# Patient Record
Sex: MALE | Race: BLACK OR AFRICAN AMERICAN | Employment: FULL TIME | URBAN - METROPOLITAN AREA
[De-identification: names, ages, dates, MRNs, and addresses within clinical notes are randomized per-mention and may not be internally consistent; named-entity substitution may affect disease eponyms.]

---

## 2020-12-25 ENCOUNTER — APPOINTMENT (OUTPATIENT)
Dept: CT IMAGING | Age: 30
DRG: 579 | End: 2020-12-25

## 2020-12-25 ENCOUNTER — HOSPITAL ENCOUNTER (INPATIENT)
Age: 30
LOS: 1 days | Discharge: HOME | DRG: 579 | End: 2020-12-25
Attending: EMERGENCY MEDICINE | Admitting: SURGERY

## 2020-12-25 ENCOUNTER — ANESTHESIA (OUTPATIENT)
Dept: OPERATING ROOM | Age: 30
DRG: 579 | End: 2020-12-25

## 2020-12-25 ENCOUNTER — ANESTHESIA EVENT (OUTPATIENT)
Dept: OPERATING ROOM | Age: 30
DRG: 579 | End: 2020-12-25

## 2020-12-25 ENCOUNTER — APPOINTMENT (OUTPATIENT)
Dept: GENERAL RADIOLOGY | Age: 30
DRG: 579 | End: 2020-12-25

## 2020-12-25 VITALS
RESPIRATION RATE: 16 BRPM | SYSTOLIC BLOOD PRESSURE: 130 MMHG | OXYGEN SATURATION: 95 % | TEMPERATURE: 98.1 F | DIASTOLIC BLOOD PRESSURE: 69 MMHG | HEART RATE: 93 BPM

## 2020-12-25 VITALS — OXYGEN SATURATION: 92 % | SYSTOLIC BLOOD PRESSURE: 96 MMHG | TEMPERATURE: 92.1 F | DIASTOLIC BLOOD PRESSURE: 75 MMHG
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
## 2020-12-25 DIAGNOSIS — S01.81XA FACIAL LACERATION, INITIAL ENCOUNTER: ICD-10-CM

## 2020-12-25 DIAGNOSIS — S11.91XA LACERATION OF NECK DUE TO ALTERCATION, INITIAL ENCOUNTER: ICD-10-CM
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
## 2020-12-25 PROBLEM — S01.312A: Status: ACTIVE | Noted: 2020-12-25

## 2020-12-25 PROBLEM — U07.1 COVID-19: Status: ACTIVE | Noted: 2020-12-25

## 2020-12-25 PROBLEM — S01.91XA COMPLEX LACERATION OF FACE: Status: ACTIVE | Noted: 2020-12-25

## 2020-12-25 PROBLEM — S01.01XA LACERATION OF SCALP: Status: ACTIVE | Noted: 2020-12-25

## 2020-12-25 PROBLEM — S01.111A: Status: ACTIVE | Noted: 2020-12-25

## 2020-12-25 PROBLEM — S01.511A: Status: ACTIVE | Noted: 2020-12-25
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
## 2020-12-25 PROBLEM — S01.21XA LACERATION OF NOSE: Status: ACTIVE | Noted: 2020-12-25

## 2020-12-25 LAB
ABO/RH: NORMAL
ALLEN TEST: ABNORMAL
ANION GAP SERPL CALCULATED.3IONS-SCNC: 14 MMOL/L (ref 9–17)
ANTIBODY SCREEN: NEGATIVE
ARM BAND NUMBER: NORMAL
BLD PROD TYP BPU: NORMAL
BLD PROD TYP BPU: NORMAL
BLOOD BANK SPECIMEN: ABNORMAL
BUN BLDV-MCNC: 3 MG/DL (ref 6–20)
CARBOXYHEMOGLOBIN: 8.3 % (ref 0–5)
CHLORIDE BLD-SCNC: 103 MMOL/L (ref 98–107)
CO2: 21 MMOL/L (ref 20–31)
CREAT SERPL-MCNC: 0.7 MG/DL (ref 0.7–1.2)
CROSSMATCH RESULT: NORMAL
CROSSMATCH RESULT: NORMAL
DISPENSE STATUS BLOOD BANK: NORMAL
DISPENSE STATUS BLOOD BANK: NORMAL
ETHANOL PERCENT: 0.23 %
ETHANOL: 232 MG/DL
EXPIRATION DATE: NORMAL
FIO2: ABNORMAL
GFR AFRICAN AMERICAN: ABNORMAL ML/MIN
GFR NON-AFRICAN AMERICAN: ABNORMAL ML/MIN
GFR SERPL CREATININE-BSD FRML MDRD: ABNORMAL ML/MIN/{1.73_M2}
GFR SERPL CREATININE-BSD FRML MDRD: ABNORMAL ML/MIN/{1.73_M2}
GLUCOSE BLD-MCNC: 169 MG/DL (ref 70–99)
HCG QUALITATIVE: ABNORMAL
HCO3 VENOUS: 22.4 MMOL/L (ref 24–30)
HCT VFR BLD CALC: 47 % (ref 40.7–50.3)
HCT VFR BLD CALC: 48.6 % (ref 40.7–50.3)
HEMOGLOBIN: 15.5 G/DL (ref 13–17)
HEMOGLOBIN: 16.1 G/DL (ref 13–17)
INR BLD: 1
MCH RBC QN AUTO: 30.7 PG (ref 25.2–33.5)
MCHC RBC AUTO-ENTMCNC: 33.1 G/DL (ref 28.4–34.8)
MCV RBC AUTO: 92.6 FL (ref 82.6–102.9)
METHEMOGLOBIN: ABNORMAL % (ref 0–1.5)
MODE: ABNORMAL
NEGATIVE BASE EXCESS, VEN: 2.4 MMOL/L (ref 0–2)
NOTIFICATION TIME: ABNORMAL
NOTIFICATION: ABNORMAL
NRBC AUTOMATED: 0 PER 100 WBC
O2 DEVICE/FLOW/%: ABNORMAL
O2 SAT, VEN: 98.6 % (ref 60–85)
OXYHEMOGLOBIN: ABNORMAL % (ref 95–98)
PARTIAL THROMBOPLASTIN TIME: 22.3 SEC (ref 20.5–30.5)
PATIENT TEMP: 37
PCO2, VEN, TEMP ADJ: ABNORMAL MMHG (ref 39–55)
PCO2, VEN: 40.7 (ref 39–55)
PDW BLD-RTO: 14.9 % (ref 11.8–14.4)
PEEP/CPAP: ABNORMAL
PH VENOUS: 7.36 (ref 7.32–7.42)
PH, VEN, TEMP ADJ: ABNORMAL (ref 7.32–7.42)
PLATELET # BLD: 321 K/UL (ref 138–453)
PMV BLD AUTO: 10.1 FL (ref 8.1–13.5)
PO2, VEN, TEMP ADJ: ABNORMAL MMHG (ref 30–50)
PO2, VEN: 129 (ref 30–50)
POSITIVE BASE EXCESS, VEN: ABNORMAL MMOL/L (ref 0–2)
POTASSIUM SERPL-SCNC: 4.4 MMOL/L (ref 3.7–5.3)
PROTHROMBIN TIME: 10.8 SEC (ref 9–12)
PSV: ABNORMAL
PT. POSITION: ABNORMAL
RBC # BLD: 5.25 M/UL (ref 4.21–5.77)
RESPIRATORY RATE: ABNORMAL
SAMPLE SITE: ABNORMAL
SARS-COV-2, RAPID: DETECTED
SARS-COV-2: ABNORMAL
SARS-COV-2: ABNORMAL
SET RATE: ABNORMAL
SODIUM BLD-SCNC: 138 MMOL/L (ref 135–144)
SOURCE: ABNORMAL
TEXT FOR RESPIRATORY: ABNORMAL
TOTAL HB: ABNORMAL G/DL (ref 12–16)
TOTAL RATE: ABNORMAL
TRANSFUSION STATUS: NORMAL
TRANSFUSION STATUS: NORMAL
UNIT DIVISION: 0
UNIT DIVISION: 0
UNIT NUMBER: NORMAL
UNIT NUMBER: NORMAL
VT: ABNORMAL
WBC # BLD: 17 K/UL (ref 3.5–11.3)

## 2020-12-25 PROCEDURE — 84703 CHORIONIC GONADOTROPIN ASSAY: CPT

## 2020-12-25 PROCEDURE — 0CQ00ZZ REPAIR UPPER LIP, OPEN APPROACH: ICD-10-PCS | Performed by: PLASTIC SURGERY

## 2020-12-25 PROCEDURE — 0JQ00ZZ REPAIR SCALP SUBCUTANEOUS TISSUE AND FASCIA, OPEN APPROACH: ICD-10-PCS | Performed by: PLASTIC SURGERY

## 2020-12-25 PROCEDURE — 85027 COMPLETE CBC AUTOMATED: CPT

## 2020-12-25 PROCEDURE — 99283 EMERGENCY DEPT VISIT LOW MDM: CPT

## 2020-12-25 PROCEDURE — 80051 ELECTROLYTE PANEL: CPT

## 2020-12-25 PROCEDURE — 71045 X-RAY EXAM CHEST 1 VIEW: CPT

## 2020-12-25 PROCEDURE — 70498 CT ANGIOGRAPHY NECK: CPT

## 2020-12-25 PROCEDURE — 09Q10ZZ REPAIR LEFT EXTERNAL EAR, OPEN APPROACH: ICD-10-PCS | Performed by: PLASTIC SURGERY

## 2020-12-25 PROCEDURE — 3600000014 HC SURGERY LEVEL 4 ADDTL 15MIN: Performed by: PLASTIC SURGERY

## 2020-12-25 PROCEDURE — 70480 CT ORBIT/EAR/FOSSA W/O DYE: CPT

## 2020-12-25 PROCEDURE — 2709999900 HC NON-CHARGEABLE SUPPLY: Performed by: PLASTIC SURGERY

## 2020-12-25 PROCEDURE — 3700000001 HC ADD 15 MINUTES (ANESTHESIA): Performed by: PLASTIC SURGERY

## 2020-12-25 PROCEDURE — 3700000000 HC ANESTHESIA ATTENDED CARE: Performed by: PLASTIC SURGERY

## 2020-12-25 PROCEDURE — U0002 COVID-19 LAB TEST NON-CDC: HCPCS

## 2020-12-25 PROCEDURE — 09QK0ZZ REPAIR NASAL MUCOSA AND SOFT TISSUE, OPEN APPROACH: ICD-10-PCS | Performed by: PLASTIC SURGERY

## 2020-12-25 PROCEDURE — 85730 THROMBOPLASTIN TIME PARTIAL: CPT

## 2020-12-25 PROCEDURE — 82805 BLOOD GASES W/O2 SATURATION: CPT

## 2020-12-25 PROCEDURE — 86850 RBC ANTIBODY SCREEN: CPT

## 2020-12-25 PROCEDURE — 2580000003 HC RX 258: Performed by: STUDENT IN AN ORGANIZED HEALTH CARE EDUCATION/TRAINING PROGRAM

## 2020-12-25 PROCEDURE — G0480 DRUG TEST DEF 1-7 CLASSES: HCPCS

## 2020-12-25 PROCEDURE — U0003 INFECTIOUS AGENT DETECTION BY NUCLEIC ACID (DNA OR RNA); SEVERE ACUTE RESPIRATORY SYNDROME CORONAVIRUS 2 (SARS-COV-2) (CORONAVIRUS DISEASE [COVID-19]), AMPLIFIED PROBE TECHNIQUE, MAKING USE OF HIGH THROUGHPUT TECHNOLOGIES AS DESCRIBED BY CMS-2020-01-R: HCPCS

## 2020-12-25 PROCEDURE — 3600000004 HC SURGERY LEVEL 4 BASE: Performed by: PLASTIC SURGERY

## 2020-12-25 PROCEDURE — 94761 N-INVAS EAR/PLS OXIMETRY MLT: CPT

## 2020-12-25 PROCEDURE — 7100000000 HC PACU RECOVERY - FIRST 15 MIN: Performed by: PLASTIC SURGERY

## 2020-12-25 PROCEDURE — 86900 BLOOD TYPING SEROLOGIC ABO: CPT

## 2020-12-25 PROCEDURE — 1200000000 HC SEMI PRIVATE

## 2020-12-25 PROCEDURE — 0KQ10ZZ REPAIR FACIAL MUSCLE, OPEN APPROACH: ICD-10-PCS | Performed by: PLASTIC SURGERY

## 2020-12-25 PROCEDURE — 85014 HEMATOCRIT: CPT

## 2020-12-25 PROCEDURE — 2500000003 HC RX 250 WO HCPCS: Performed by: SPECIALIST

## 2020-12-25 PROCEDURE — 2580000003 HC RX 258: Performed by: SPECIALIST

## 2020-12-25 PROCEDURE — 0JQ50ZZ REPAIR LEFT NECK SUBCUTANEOUS TISSUE AND FASCIA, OPEN APPROACH: ICD-10-PCS | Performed by: PLASTIC SURGERY

## 2020-12-25 PROCEDURE — 86920 COMPATIBILITY TEST SPIN: CPT

## 2020-12-25 PROCEDURE — 84520 ASSAY OF UREA NITROGEN: CPT

## 2020-12-25 PROCEDURE — 85610 PROTHROMBIN TIME: CPT

## 2020-12-25 PROCEDURE — 6360000004 HC RX CONTRAST MEDICATION: Performed by: SURGERY

## 2020-12-25 PROCEDURE — 82565 ASSAY OF CREATININE: CPT

## 2020-12-25 PROCEDURE — 82947 ASSAY GLUCOSE BLOOD QUANT: CPT

## 2020-12-25 PROCEDURE — 86901 BLOOD TYPING SEROLOGIC RH(D): CPT

## 2020-12-25 PROCEDURE — 2580000003 HC RX 258: Performed by: PLASTIC SURGERY

## 2020-12-25 PROCEDURE — 7100000001 HC PACU RECOVERY - ADDTL 15 MIN: Performed by: PLASTIC SURGERY

## 2020-12-25 PROCEDURE — 72125 CT NECK SPINE W/O DYE: CPT

## 2020-12-25 PROCEDURE — 08QQXZZ REPAIR RIGHT LOWER EYELID, EXTERNAL APPROACH: ICD-10-PCS | Performed by: PLASTIC SURGERY

## 2020-12-25 PROCEDURE — 6370000000 HC RX 637 (ALT 250 FOR IP): Performed by: HEALTH CARE PROVIDER

## 2020-12-25 PROCEDURE — 85018 HEMOGLOBIN: CPT

## 2020-12-25 PROCEDURE — 6360000002 HC RX W HCPCS: Performed by: SPECIALIST

## 2020-12-25 PROCEDURE — 87641 MR-STAPH DNA AMP PROBE: CPT

## 2020-12-25 RX ORDER — NEOSTIGMINE METHYLSULFATE 5 MG/5 ML
SYRINGE (ML) INTRAVENOUS PRN
Status: DISCONTINUED | OUTPATIENT
Start: 2020-12-25 | End: 2020-12-25 | Stop reason: SDUPTHER

## 2020-12-25 RX ORDER — LIDOCAINE HYDROCHLORIDE AND EPINEPHRINE BITARTRATE 20; .01 MG/ML; MG/ML
INJECTION, SOLUTION SUBCUTANEOUS
Status: DISPENSED
Start: 2020-12-25 | End: 2020-12-25

## 2020-12-25 RX ORDER — METHOCARBAMOL 750 MG/1
750 TABLET, FILM COATED ORAL EVERY 8 HOURS
Status: DISCONTINUED | OUTPATIENT
Start: 2020-12-25 | End: 2020-12-25 | Stop reason: HOSPADM

## 2020-12-25 RX ORDER — LANOLIN ALCOHOL/MO/W.PET/CERES
100 CREAM (GRAM) TOPICAL DAILY
Status: DISCONTINUED | OUTPATIENT
Start: 2020-12-25 | End: 2020-12-25 | Stop reason: HOSPADM

## 2020-12-25 RX ORDER — SODIUM CHLORIDE 0.9 % (FLUSH) 0.9 %
10 SYRINGE (ML) INJECTION PRN
Status: DISCONTINUED | OUTPATIENT
Start: 2020-12-25 | End: 2020-12-25 | Stop reason: HOSPADM

## 2020-12-25 RX ORDER — LIDOCAINE HYDROCHLORIDE 10 MG/ML
INJECTION, SOLUTION EPIDURAL; INFILTRATION; INTRACAUDAL; PERINEURAL PRN
Status: DISCONTINUED | OUTPATIENT
Start: 2020-12-25 | End: 2020-12-25 | Stop reason: SDUPTHER

## 2020-12-25 RX ORDER — ACETAMINOPHEN 500 MG
1000 TABLET ORAL EVERY 8 HOURS SCHEDULED
Status: DISCONTINUED | OUTPATIENT
Start: 2020-12-25 | End: 2020-12-25 | Stop reason: HOSPADM

## 2020-12-25 RX ORDER — FENTANYL CITRATE 50 UG/ML
INJECTION, SOLUTION INTRAMUSCULAR; INTRAVENOUS PRN
Status: DISCONTINUED | OUTPATIENT
Start: 2020-12-25 | End: 2020-12-25 | Stop reason: SDUPTHER

## 2020-12-25 RX ORDER — CEFAZOLIN SODIUM 1 G/3ML
INJECTION, POWDER, FOR SOLUTION INTRAMUSCULAR; INTRAVENOUS PRN
Status: DISCONTINUED | OUTPATIENT
Start: 2020-12-25 | End: 2020-12-25 | Stop reason: SDUPTHER

## 2020-12-25 RX ORDER — MIDAZOLAM HYDROCHLORIDE 2 MG/2ML
2 INJECTION, SOLUTION INTRAMUSCULAR; INTRAVENOUS
Status: CANCELLED | OUTPATIENT
Start: 2020-12-25 | End: 2020-12-25

## 2020-12-25 RX ORDER — ONDANSETRON 2 MG/ML
4 INJECTION INTRAMUSCULAR; INTRAVENOUS
Status: DISCONTINUED | OUTPATIENT
Start: 2020-12-25 | End: 2020-12-25 | Stop reason: HOSPADM

## 2020-12-25 RX ORDER — ONDANSETRON 2 MG/ML
4 INJECTION INTRAMUSCULAR; INTRAVENOUS EVERY 6 HOURS PRN
Status: DISCONTINUED | OUTPATIENT
Start: 2020-12-25 | End: 2020-12-25 | Stop reason: HOSPADM

## 2020-12-25 RX ORDER — OXYCODONE HYDROCHLORIDE 5 MG/1
5 TABLET ORAL EVERY 6 HOURS PRN
Qty: 12 TABLET | Refills: 0 | Status: SHIPPED | OUTPATIENT
Start: 2020-12-25 | End: 2020-12-28

## 2020-12-25 RX ORDER — SODIUM CHLORIDE 0.9 % (FLUSH) 0.9 %
10 SYRINGE (ML) INJECTION EVERY 12 HOURS SCHEDULED
Status: DISCONTINUED | OUTPATIENT
Start: 2020-12-25 | End: 2020-12-25 | Stop reason: HOSPADM

## 2020-12-25 RX ORDER — GABAPENTIN 300 MG/1
300 CAPSULE ORAL EVERY 8 HOURS
Status: DISCONTINUED | OUTPATIENT
Start: 2020-12-25 | End: 2020-12-25 | Stop reason: HOSPADM

## 2020-12-25 RX ORDER — OXYCODONE HYDROCHLORIDE 5 MG/1
5 TABLET ORAL EVERY 4 HOURS PRN
Status: DISCONTINUED | OUTPATIENT
Start: 2020-12-25 | End: 2020-12-25 | Stop reason: HOSPADM

## 2020-12-25 RX ORDER — LIDOCAINE HYDROCHLORIDE 10 MG/ML
INJECTION, SOLUTION INFILTRATION; PERINEURAL
Status: DISCONTINUED
Start: 2020-12-25 | End: 2020-12-25 | Stop reason: WASHOUT

## 2020-12-25 RX ORDER — CEFAZOLIN SODIUM 1 G/50ML
INJECTION, SOLUTION INTRAVENOUS
Status: DISPENSED
Start: 2020-12-25 | End: 2020-12-25

## 2020-12-25 RX ORDER — SUCCINYLCHOLINE/SOD CL,ISO/PF 100 MG/5ML
SYRINGE (ML) INTRAVENOUS PRN
Status: DISCONTINUED | OUTPATIENT
Start: 2020-12-25 | End: 2020-12-25 | Stop reason: SDUPTHER

## 2020-12-25 RX ORDER — 0.9 % SODIUM CHLORIDE 0.9 %
20 INTRAVENOUS SOLUTION INTRAVENOUS ONCE
Status: COMPLETED | OUTPATIENT
Start: 2020-12-25 | End: 2020-12-25

## 2020-12-25 RX ORDER — POLYETHYLENE GLYCOL 3350 17 G/17G
17 POWDER, FOR SOLUTION ORAL DAILY PRN
Status: DISCONTINUED | OUTPATIENT
Start: 2020-12-25 | End: 2020-12-25 | Stop reason: HOSPADM

## 2020-12-25 RX ORDER — SODIUM CHLORIDE 0.9 % (FLUSH) 0.9 %
10 SYRINGE (ML) INJECTION PRN
Status: CANCELLED | OUTPATIENT
Start: 2020-12-25

## 2020-12-25 RX ORDER — GLYCOPYRROLATE 1 MG/5 ML
SYRINGE (ML) INTRAVENOUS PRN
Status: DISCONTINUED | OUTPATIENT
Start: 2020-12-25 | End: 2020-12-25 | Stop reason: SDUPTHER

## 2020-12-25 RX ORDER — MAGNESIUM HYDROXIDE 1200 MG/15ML
LIQUID ORAL CONTINUOUS PRN
Status: COMPLETED | OUTPATIENT
Start: 2020-12-25 | End: 2020-12-25

## 2020-12-25 RX ORDER — FENTANYL CITRATE 50 UG/ML
25 INJECTION, SOLUTION INTRAMUSCULAR; INTRAVENOUS ONCE
Status: CANCELLED | OUTPATIENT
Start: 2020-12-25 | End: 2020-12-25

## 2020-12-25 RX ORDER — ROCURONIUM BROMIDE 10 MG/ML
INJECTION, SOLUTION INTRAVENOUS PRN
Status: DISCONTINUED | OUTPATIENT
Start: 2020-12-25 | End: 2020-12-25 | Stop reason: SDUPTHER

## 2020-12-25 RX ORDER — SODIUM CHLORIDE, SODIUM LACTATE, POTASSIUM CHLORIDE, CALCIUM CHLORIDE 600; 310; 30; 20 MG/100ML; MG/100ML; MG/100ML; MG/100ML
INJECTION, SOLUTION INTRAVENOUS CONTINUOUS PRN
Status: DISCONTINUED | OUTPATIENT
Start: 2020-12-25 | End: 2020-12-25 | Stop reason: SDUPTHER

## 2020-12-25 RX ORDER — ACETAMINOPHEN 160 MG
TABLET,DISINTEGRATING ORAL PRN
Status: DISCONTINUED | OUTPATIENT
Start: 2020-12-25 | End: 2020-12-25 | Stop reason: ALTCHOICE

## 2020-12-25 RX ORDER — MULTIVITAMIN WITH IRON
1 TABLET ORAL DAILY
Status: DISCONTINUED | OUTPATIENT
Start: 2020-12-25 | End: 2020-12-25 | Stop reason: HOSPADM

## 2020-12-25 RX ORDER — SODIUM CHLORIDE, SODIUM LACTATE, POTASSIUM CHLORIDE, CALCIUM CHLORIDE 600; 310; 30; 20 MG/100ML; MG/100ML; MG/100ML; MG/100ML
INJECTION, SOLUTION INTRAVENOUS CONTINUOUS
Status: CANCELLED | OUTPATIENT
Start: 2020-12-25

## 2020-12-25 RX ORDER — ONDANSETRON 2 MG/ML
4 INJECTION INTRAMUSCULAR; INTRAVENOUS ONCE
Status: CANCELLED | OUTPATIENT
Start: 2020-12-25 | End: 2020-12-25

## 2020-12-25 RX ORDER — SODIUM CHLORIDE 0.9 % (FLUSH) 0.9 %
10 SYRINGE (ML) INJECTION EVERY 12 HOURS SCHEDULED
Status: CANCELLED | OUTPATIENT
Start: 2020-12-25

## 2020-12-25 RX ORDER — FENTANYL CITRATE 50 UG/ML
50 INJECTION, SOLUTION INTRAMUSCULAR; INTRAVENOUS EVERY 5 MIN PRN
Status: DISCONTINUED | OUTPATIENT
Start: 2020-12-25 | End: 2020-12-25 | Stop reason: HOSPADM

## 2020-12-25 RX ORDER — PROPOFOL 10 MG/ML
INJECTION, EMULSION INTRAVENOUS PRN
Status: DISCONTINUED | OUTPATIENT
Start: 2020-12-25 | End: 2020-12-25 | Stop reason: SDUPTHER

## 2020-12-25 RX ORDER — LIDOCAINE HYDROCHLORIDE AND EPINEPHRINE 10; 10 MG/ML; UG/ML
20 INJECTION, SOLUTION INFILTRATION; PERINEURAL ONCE
Status: DISCONTINUED | OUTPATIENT
Start: 2020-12-25 | End: 2020-12-25 | Stop reason: HOSPADM

## 2020-12-25 RX ORDER — ONDANSETRON 2 MG/ML
INJECTION INTRAMUSCULAR; INTRAVENOUS PRN
Status: DISCONTINUED | OUTPATIENT
Start: 2020-12-25 | End: 2020-12-25 | Stop reason: SDUPTHER

## 2020-12-25 RX ORDER — FENTANYL CITRATE 50 UG/ML
INJECTION, SOLUTION INTRAMUSCULAR; INTRAVENOUS
Status: COMPLETED
Start: 2020-12-25 | End: 2020-12-25

## 2020-12-25 RX ORDER — PHENYLEPHRINE HCL IN 0.9% NACL 1 MG/10 ML
SYRINGE (ML) INTRAVENOUS PRN
Status: DISCONTINUED | OUTPATIENT
Start: 2020-12-25 | End: 2020-12-25 | Stop reason: SDUPTHER

## 2020-12-25 RX ADMIN — SODIUM CHLORIDE, POTASSIUM CHLORIDE, SODIUM LACTATE AND CALCIUM CHLORIDE: 600; 310; 30; 20 INJECTION, SOLUTION INTRAVENOUS at 07:37

## 2020-12-25 RX ADMIN — Medication 0.4 MG: at 09:15

## 2020-12-25 RX ADMIN — CEFAZOLIN 2000 MG: 1 INJECTION, POWDER, FOR SOLUTION INTRAMUSCULAR; INTRAVENOUS at 07:43

## 2020-12-25 RX ADMIN — FENTANYL CITRATE 100 MCG: 50 INJECTION, SOLUTION INTRAMUSCULAR; INTRAVENOUS at 07:33

## 2020-12-25 RX ADMIN — ACETAMINOPHEN 1000 MG: 500 TABLET ORAL at 15:17

## 2020-12-25 RX ADMIN — Medication 200 MCG: at 07:35

## 2020-12-25 RX ADMIN — ROCURONIUM BROMIDE 50 MG: 10 INJECTION, SOLUTION INTRAVENOUS at 07:33

## 2020-12-25 RX ADMIN — METHOCARBAMOL 750 MG: 750 TABLET, FILM COATED ORAL at 12:18

## 2020-12-25 RX ADMIN — LIDOCAINE HYDROCHLORIDE 100 MG: 10 INJECTION, SOLUTION EPIDURAL; INFILTRATION; INTRACAUDAL; PERINEURAL at 07:33

## 2020-12-25 RX ADMIN — SODIUM CHLORIDE, PRESERVATIVE FREE 10 ML: 5 INJECTION INTRAVENOUS at 12:18

## 2020-12-25 RX ADMIN — SODIUM CHLORIDE 300 ML: 0.9 INJECTION, SOLUTION INTRAVENOUS at 07:42

## 2020-12-25 RX ADMIN — GABAPENTIN 300 MG: 300 CAPSULE ORAL at 12:18

## 2020-12-25 RX ADMIN — Medication 200 MCG: at 07:38

## 2020-12-25 RX ADMIN — PROPOFOL 250 MG: 10 INJECTION, EMULSION INTRAVENOUS at 07:33

## 2020-12-25 RX ADMIN — ONDANSETRON 4 MG: 2 INJECTION INTRAMUSCULAR; INTRAVENOUS at 09:06

## 2020-12-25 RX ADMIN — IOPAMIDOL 90 ML: 755 INJECTION, SOLUTION INTRAVENOUS at 03:54

## 2020-12-25 RX ADMIN — Medication 100 MG: at 07:33

## 2020-12-25 RX ADMIN — Medication 4 MG: at 09:15

## 2020-12-25 ASSESSMENT — PULMONARY FUNCTION TESTS
PIF_VALUE: 19
PIF_VALUE: 18
PIF_VALUE: 19
PIF_VALUE: 18
PIF_VALUE: 19
PIF_VALUE: 18
PIF_VALUE: 16
PIF_VALUE: 19
PIF_VALUE: 19
PIF_VALUE: 2
PIF_VALUE: 7
PIF_VALUE: 19
PIF_VALUE: 37
PIF_VALUE: 19
PIF_VALUE: 18
PIF_VALUE: 19
PIF_VALUE: 1
PIF_VALUE: 19
PIF_VALUE: 21
PIF_VALUE: 19
PIF_VALUE: 18
PIF_VALUE: 19
PIF_VALUE: 19
PIF_VALUE: 3
PIF_VALUE: 1
PIF_VALUE: 6
PIF_VALUE: 19
PIF_VALUE: 35
PIF_VALUE: 19
PIF_VALUE: 19
PIF_VALUE: 18
PIF_VALUE: 20
PIF_VALUE: 19
PIF_VALUE: 19
PIF_VALUE: 20
PIF_VALUE: 1
PIF_VALUE: 19
PIF_VALUE: 20
PIF_VALUE: 18
PIF_VALUE: 19
PIF_VALUE: 8
PIF_VALUE: 19
PIF_VALUE: 1
PIF_VALUE: 19
PIF_VALUE: 20
PIF_VALUE: 17
PIF_VALUE: 17
PIF_VALUE: 20
PIF_VALUE: 19
PIF_VALUE: 4
PIF_VALUE: 19
PIF_VALUE: 18
PIF_VALUE: 19
PIF_VALUE: 17
PIF_VALUE: 19
PIF_VALUE: 18
PIF_VALUE: 19

## 2020-12-25 ASSESSMENT — ENCOUNTER SYMPTOMS
SHORTNESS OF BREATH: 0
TROUBLE SWALLOWING: 0
ABDOMINAL PAIN: 0
COLOR CHANGE: 1
VOICE CHANGE: 0
PHOTOPHOBIA: 1
EYE PAIN: 1

## 2020-12-25 ASSESSMENT — PAIN SCALES - WONG BAKER: WONGBAKER_NUMERICALRESPONSE: 0
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
## 2020-12-25 NOTE — PROGRESS NOTES
Went in to discharge patient. Patient stated hesitancy on being discharged. I asked what patient's were concerns were. Patient stated his concerns regard bleeding/oozing from recent injuries and pain management. Will reach out to surgery to discuss these concerns with patient further.

## 2020-12-25 NOTE — FLOWSHEET NOTE
12/25/20 1521   Provider Notification   Reason for Communication Evaluate; Review case   Provider Name    Provider Notification Resident   Method of Communication Secure Message   Response Other (Comment)   Notification Time      Discussed patient's concerns and hesitations on being discharged. Patient did not fully understand why he was bleeding/oozing from his fresh wounds even after surgery. Patient said he would feel more comfortable after talking to a surgeon. Patient also had concerns about pain management. Addressed patients concerns and educated him on pain management and post-op care/wounds. Reached out to  to better address patient's concerns and educate him.
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
## 2020-12-25 NOTE — ED PROVIDER NOTES
[facility]  FACULTY HANDOFF       Handoff taken on the following patient from prior Attending Physician:  Pt Name: [place]  PCP:  No primary care provider on file. Attestation  I was available and discussed any additional care issues that arose and coordinated the management plans with the resident(s) caring for the patient during my duty period. Any areas of disagreement with resident's documentation of care or procedures are noted on the chart. I was personally present for the key portions of any/all procedures during my duty period. I have documented in the chart those procedures where I was not present during the key portions. CHIEF COMPLAINT     No chief complaint on file. CURRENT MEDICATIONS     Previous Medications  Previous Medications    No medications on file       Encounter Medications  Orders Placed This Encounter   Medications    fentaNYL (SUBLIMAZE) 100 MCG/2ML injection     DEBBI GRAY: cabinet override    ceFAZolin (ANCEF) 1-4 GM/50ML-% IVPB (premix)     DEBBI GRAY: cabinet override    ceFAZolin (ANCEF) 1-4 GM/50ML-% IVPB (premix)     DEBBI GRAY: cabinet override    iopamidol (ISOVUE-370) 76 % injection 90 mL    lidocaine-EPINEPHrine 1 percent-1:906764 injection 20 mL    lidocaine-EPINEPHrine 2 percent-1:750445 injection     Chrissy Quill: cabinet override    DISCONTD: lidocaine 1 % injection     Chrissy Quill: cabinet override    lidocaine-EPINEPHrine 2 percent-1:030429 injection     Chrissy Quill: cabinet override    0.9 % sodium chloride bolus    ondansetron (ZOFRAN) injection 4 mg    fentaNYL (SUBLIMAZE) injection 50 mcg    sodium chloride 0.9 % irrigation       ALLERGIES     has No Known Allergies. RECENT VITALS:    ,  Pulse: 117, Resp: 18, BP: 110/76    RADIOLOGY:   CT ORBITS WO CONTRAST   Final Result   1. No acute traumatic injury in the large arteries of the neck.       2.  Extraconal hemorrhage Keaton Muniz MD  12/25/20 0822
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
## 2020-12-25 NOTE — BRIEF OP NOTE
Brief Postoperative Note      [facility]  YOB[facility] 1879  MRN: [id number]    Date of Procedure: 12/25/2020    Pre-Op Diagnosis: ADD ON, FACIAL LACERATIONS    Post-Op Diagnosis: Complex lacerations involving the right forehead and brow, right upper eyelid, right side of the nose, right cheek and mouth, left ear, left cheek and left base of neck       Procedure(s):  DEBRIDEMENT AND CLOSURE OF FACIAL, NECK AND EAR LACERATION  1. Sharp excisional debridement and complex wound closure of right brow and forehead laceration involving skin, subcutaneous tissue and muscle total of 12 cm sharp excisional debridement and complex wound closure  2. Sharp excisional debridement and complex wound closure right side of nose involving cartilage, skin and subcutaneous tissue for a total of 6 cm complex wound closure. 3.  Open repair of nasal septal cartilage laceration  4. Sharp excisional debridement and complex wound closure right corner of Mouth and Upper Lip Laceration Measuring approximately 9 cm of complex wound closure  5. Sharp excisional debridement and complex wound closure of right upper eyelid through and through laceration measuring approximately 4 cm with reapproximation of orbicularis and levator muscle  6. Exploration of laceration left base of neck with Sharp excisional debridement and complex wound closure left base of neck wound measuring 12 cm  7. Sharp excisional debridement and complex wound closure left ear measuring approximately 8.5 cm with through and through laceration involving skin, subcutaneous tissue, and cartilage. 8.  Sharp excisional debridement and complex wound closure left posterior scalp, measuring approximately 3.5 cm of complex wound closure    Surgeon(s):   Arsh Anton MD    Assistant:  * No surgical staff found *    Anesthesia: General    Estimated Blood Loss (mL): less than 50     Complications: None    Specimens:   * No specimens in log *

## 2020-12-25 NOTE — BRIEF OP NOTE
Operative Note      Patient: Bj Trauma ShantWard  YOB: 1879  MRN: [id number]    Date of Procedure: 12/25/2020    Pre-Op Diagnosis: ADD ON, FACIAL LACERATIONS    Post-Op Diagnosis: Complex lacerations involving the right forehead and brow, right upper eyelid, right side of the nose, right cheek and mouth, left ear, left cheek and left base of neck       Procedure(s):  DEBRIDEMENT AND CLOSURE OF FACIAL, NECK AND EAR LACERATION  1. Sharp excisional debridement and complex wound closure of right brow and forehead laceration involving skin, subcutaneous tissue and muscle total of 12 cm sharp excisional debridement and complex wound closure  2. Sharp excisional debridement and complex wound closure right side of nose involving cartilage, skin and subcutaneous tissue for a total of 6 cm complex wound closure. 3.  Open repair of nasal septal cartilage laceration  4. Sharp excisional debridement and complex wound closure right corner of Mouth and Upper Lip Laceration Measuring approximately 9 cm of complex wound closure  5. Sharp excisional debridement and complex wound closure of right upper eyelid through and through laceration measuring approximately 4 cm with reapproximation of orbicularis and levator muscle  6. Exploration of laceration left base of neck with Sharp excisional debridement and complex wound closure left base of neck wound measuring 12 cm  7. Sharp excisional debridement and complex wound closure left ear measuring approximately 8.5 cm with through and through laceration involving skin, subcutaneous tissue, and cartilage. 8.  Sharp excisional debridement and complex wound closure left posterior scalp, measuring approximately 3.5 cm of complex wound closure    Surgeon(s):   Bill Gresham MD    Assistant:  * No surgical staff found *    Anesthesia: General    Estimated Blood Loss (mL): less than 50     Complications: None    Specimens:   * No specimens in log *    Implants:  * No implants in log *      Drains: * No LDAs found *    Findings: See operative report

## 2020-12-26 LAB
MRSA, DNA, NASAL: NORMAL
SPECIMEN DESCRIPTION: NORMAL
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
## 2021-01-20 NOTE — OP NOTE
Patient: Lul Aguayo XxBigelow  YOB: 1879  MRN: [id number]    Date of Procedure: 12/25/2020    Pre-Op Diagnosis: ADD ON, FACIAL LACERATIONS    Post-Op Diagnosis: Complex lacerations involving the right forehead and brow, right upper eyelid, right side of the nose, right cheek and mouth, left ear, left cheek and left base of neck       Procedure(s):  DEBRIDEMENT AND CLOSURE OF FACIAL, NECK AND EAR LACERATION  1. Sharp excisional debridement and complex wound closure of right brow and forehead laceration involving skin, subcutaneous tissue and muscle total of 12 cm sharp excisional debridement and complex wound closure  2. Sharp excisional debridement and complex wound closure right side of nose involving cartilage, skin and subcutaneous tissue for a total of 6 cm complex wound closure. 3.  Open repair of nasal septal cartilage laceration  4. Sharp excisional debridement and complex wound closure right corner of Mouth and Upper Lip Laceration Measuring approximately 9 cm of complex wound closure  5. Sharp excisional debridement and complex wound closure of right upper eyelid through and through laceration measuring approximately 4 cm with reapproximation of orbicularis and levator muscle  6. Exploration of laceration left base of neck with Sharp excisional debridement and complex wound closure left base of neck wound measuring 12 cm  7. Sharp excisional debridement and complex wound closure left ear measuring approximately 8.5 cm with through and through laceration involving skin, subcutaneous tissue, and cartilage. 8.  Sharp excisional debridement and complex wound closure left posterior scalp, measuring approximately 3.5 cm of complex wound closure    Surgeon(s):   Christiano Monk MD    Assistant:  * No surgical staff found *    Anesthesia: General    Estimated Blood Loss (mL): less than 50     Complications: None    Specimens:   * No specimens in log *    Implants:  * No implants in log *      Drains: * No LDAs found *    Procedure:  Patient was brought into the operating room and intubated without difficulty. His face was prepped and draped in sterile fashion. He had multiple facial lacerations. We proceeded to sharply excised the skin edges with a #15 blade to the complex lacerations of the right brow and forehead as well as the right side of the nose, corner of the mouth, left base of the neck, left scalp and left ear. Once the skin edges were cleaned and sharply excised and debrided we proceeded to copiously irrigate the wounds. Starting first with the right brown forehead laceration, the subcutaneous tissue and muscle were reapproximated with 3-0 Vicryl suture followed by 3-0 Monocryl in the subcutaneous and subdermal region and 4-0 Monocryl running subcuticular on the skin. A total of 12 cm of sharp excisional debridement and complex wound closure was performed. Attention was then turned to the right side of the nose where he had through and through lacerations involving the skin as well as the underlying cartilage. The wound was copiously irrigated after sharp excisional debridement and repaired injury complex multilayered fashion involving the septum with 4-0 Vicryl suture followed by 4-0 Monocryl in the skin and subcutaneous tissue and 5-0 fast-absorbing on the skin with Dermabond. The corner of the mouth was then addressed which was also sharply debrided and repaired in a multilayered fashion for a total of 9 cm complex wound closure using 4-0 Monocryl in the deep dermal layer, 4 Monocryl dermal layer and 4-0 Monocryl running subcuticular on the skin. Attention was then turned to the base of the neck which was explored and no deep structures were identified as being transected. The wound was copiously irrigated after sharp excisional debridement of 12 cm complex closure performed.   The right upper eyelid was also addressed and repaired in a multilayered fashion after sharp excisional debridement for total of 4 cm complex wound closure using 4-0 Monocryl deep dermal layer and 5-0 fast on the skin. Finally the left ear and posterior scalp were dressed. After copious irrigation and sharp excisional debridement the skin edges were reapproximated with 4-0 Vicryl in the ear cartilage, 4-0 Vicryl in the posterior scalp laceration, 5-0 chromic on the skin of the ear and 4-0 Monocryl on the posterior scalp laceration. All sizes and length of repairs are listed above. Procedure well.   Dermabond postop recovery in stable

## 2024-09-18 ENCOUNTER — HOSPITAL ENCOUNTER (EMERGENCY)
Age: 34
Discharge: HOME | End: 2024-09-18
Attending: STUDENT IN AN ORGANIZED HEALTH CARE EDUCATION/TRAINING PROGRAM
Payer: COMMERCIAL

## 2024-09-18 VITALS
HEART RATE: 94 BPM | RESPIRATION RATE: 18 BRPM | OXYGEN SATURATION: 99 % | SYSTOLIC BLOOD PRESSURE: 118 MMHG | TEMPERATURE: 98.5 F | DIASTOLIC BLOOD PRESSURE: 87 MMHG

## 2024-09-18 DIAGNOSIS — R19.7 DIARRHEA, UNSPECIFIED TYPE: Primary | ICD-10-CM

## 2024-09-18 LAB
ALBUMIN SERPL-MCNC: 4.1 G/DL (ref 3.5–5.2)
ALBUMIN/GLOB SERPL: 1 {RATIO} (ref 1–2.5)
ALP SERPL-CCNC: 124 U/L (ref 40–129)
ALT SERPL-CCNC: 17 U/L (ref 10–50)
ANION GAP SERPL CALCULATED.3IONS-SCNC: 11 MMOL/L (ref 9–16)
AST SERPL-CCNC: 32 U/L (ref 10–50)
BASOPHILS # BLD: <0.03 K/UL (ref 0–0.2)
BASOPHILS NFR BLD: 0 % (ref 0–2)
BILIRUB DIRECT SERPL-MCNC: 0.4 MG/DL (ref 0–0.2)
BILIRUB INDIRECT SERPL-MCNC: 0.7 MG/DL (ref 0–1)
BILIRUB SERPL-MCNC: 1.1 MG/DL (ref 0–1.2)
BUN SERPL-MCNC: 9 MG/DL (ref 6–20)
CALCIUM SERPL-MCNC: 8.9 MG/DL (ref 8.6–10.4)
CHLORIDE SERPL-SCNC: 99 MMOL/L (ref 98–107)
CO2 SERPL-SCNC: 27 MMOL/L (ref 20–31)
CREAT SERPL-MCNC: 1.1 MG/DL (ref 0.7–1.2)
EOSINOPHIL # BLD: 0.23 K/UL (ref 0–0.44)
EOSINOPHILS RELATIVE PERCENT: 3 % (ref 1–4)
ERYTHROCYTE [DISTWIDTH] IN BLOOD BY AUTOMATED COUNT: 15.6 % (ref 11.8–14.4)
GFR, ESTIMATED: >90 ML/MIN/1.73M2
GLOBULIN SER CALC-MCNC: 3.5 G/DL
GLUCOSE SERPL-MCNC: 120 MG/DL (ref 74–99)
HCT VFR BLD AUTO: 48.7 % (ref 40.7–50.3)
HGB BLD-MCNC: 16.6 G/DL (ref 13–17)
IMM GRANULOCYTES # BLD AUTO: 0.04 K/UL (ref 0–0.3)
IMM GRANULOCYTES NFR BLD: 1 %
LIPASE SERPL-CCNC: 24 U/L (ref 13–60)
LYMPHOCYTES NFR BLD: 1.32 K/UL (ref 1.1–3.7)
LYMPHOCYTES RELATIVE PERCENT: 16 % (ref 24–43)
MCH RBC QN AUTO: 31.3 PG (ref 25.2–33.5)
MCHC RBC AUTO-ENTMCNC: 34.1 G/DL (ref 28.4–34.8)
MCV RBC AUTO: 91.9 FL (ref 82.6–102.9)
MONOCYTES NFR BLD: 0.7 K/UL (ref 0.1–1.2)
MONOCYTES NFR BLD: 9 % (ref 3–12)
NEUTROPHILS NFR BLD: 71 % (ref 36–65)
NEUTS SEG NFR BLD: 5.74 K/UL (ref 1.5–8.1)
NRBC BLD-RTO: 0 PER 100 WBC
PLATELET # BLD AUTO: 214 K/UL (ref 138–453)
PMV BLD AUTO: 9.8 FL (ref 8.1–13.5)
POTASSIUM SERPL-SCNC: 3.4 MMOL/L (ref 3.7–5.3)
PROT SERPL-MCNC: 7.6 G/DL (ref 6.6–8.7)
RBC # BLD AUTO: 5.3 M/UL (ref 4.21–5.77)
RBC # BLD: ABNORMAL 10*6/UL
SODIUM SERPL-SCNC: 137 MMOL/L (ref 136–145)
WBC OTHER # BLD: 8.1 K/UL (ref 3.5–11.3)

## 2024-09-18 PROCEDURE — 2580000003 HC RX 258: Performed by: STUDENT IN AN ORGANIZED HEALTH CARE EDUCATION/TRAINING PROGRAM

## 2024-09-18 PROCEDURE — 96361 HYDRATE IV INFUSION ADD-ON: CPT

## 2024-09-18 PROCEDURE — 80076 HEPATIC FUNCTION PANEL: CPT

## 2024-09-18 PROCEDURE — 96375 TX/PRO/DX INJ NEW DRUG ADDON: CPT

## 2024-09-18 PROCEDURE — 83690 ASSAY OF LIPASE: CPT

## 2024-09-18 PROCEDURE — 6360000002 HC RX W HCPCS

## 2024-09-18 PROCEDURE — 80048 BASIC METABOLIC PNL TOTAL CA: CPT

## 2024-09-18 PROCEDURE — 99284 EMERGENCY DEPT VISIT MOD MDM: CPT

## 2024-09-18 PROCEDURE — 6370000000 HC RX 637 (ALT 250 FOR IP)

## 2024-09-18 PROCEDURE — 2500000003 HC RX 250 WO HCPCS: Performed by: STUDENT IN AN ORGANIZED HEALTH CARE EDUCATION/TRAINING PROGRAM

## 2024-09-18 PROCEDURE — 96374 THER/PROPH/DIAG INJ IV PUSH: CPT

## 2024-09-18 PROCEDURE — 85025 COMPLETE CBC W/AUTO DIFF WBC: CPT

## 2024-09-18 RX ORDER — POTASSIUM CHLORIDE 750 MG/1
10 TABLET, EXTENDED RELEASE ORAL 2 TIMES DAILY
Qty: 180 TABLET | Refills: 1 | Status: SHIPPED | OUTPATIENT
Start: 2024-09-18

## 2024-09-18 RX ORDER — FAMOTIDINE 20 MG/1
20 TABLET, FILM COATED ORAL 2 TIMES DAILY
Qty: 60 TABLET | Refills: 5 | Status: SHIPPED | OUTPATIENT
Start: 2024-09-18 | End: 2024-09-18

## 2024-09-18 RX ORDER — 0.9 % SODIUM CHLORIDE 0.9 %
1000 INTRAVENOUS SOLUTION INTRAVENOUS ONCE
Status: COMPLETED | OUTPATIENT
Start: 2024-09-18 | End: 2024-09-18

## 2024-09-18 RX ORDER — KETOROLAC TROMETHAMINE 15 MG/ML
15 INJECTION, SOLUTION INTRAMUSCULAR; INTRAVENOUS
Status: COMPLETED | OUTPATIENT
Start: 2024-09-18 | End: 2024-09-18

## 2024-09-18 RX ORDER — FAMOTIDINE 20 MG/1
20 TABLET, FILM COATED ORAL 2 TIMES DAILY
Qty: 60 TABLET | Refills: 5 | Status: SHIPPED | OUTPATIENT
Start: 2024-09-18

## 2024-09-18 RX ORDER — POTASSIUM CHLORIDE 750 MG/1
10 TABLET, EXTENDED RELEASE ORAL 2 TIMES DAILY
Qty: 180 TABLET | Refills: 1 | Status: SHIPPED | OUTPATIENT
Start: 2024-09-18 | End: 2024-09-18

## 2024-09-18 RX ADMIN — KETOROLAC TROMETHAMINE 15 MG: 15 INJECTION, SOLUTION INTRAMUSCULAR; INTRAVENOUS at 15:07

## 2024-09-18 RX ADMIN — SODIUM CHLORIDE 1000 ML: 9 INJECTION, SOLUTION INTRAVENOUS at 15:09

## 2024-09-18 RX ADMIN — POTASSIUM BICARBONATE 20 MEQ: 782 TABLET, EFFERVESCENT ORAL at 15:51

## 2024-09-18 RX ADMIN — FAMOTIDINE 20 MG: 10 INJECTION, SOLUTION INTRAVENOUS at 15:07

## 2024-09-18 ASSESSMENT — ENCOUNTER SYMPTOMS
VOMITING: 0
EYES NEGATIVE: 1
SHORTNESS OF BREATH: 0
RESPIRATORY NEGATIVE: 1
DIARRHEA: 1
NAUSEA: 0
ABDOMINAL PAIN: 1

## 2024-09-18 ASSESSMENT — PAIN SCALES - GENERAL: PAINLEVEL_OUTOF10: 0

## 2024-09-18 ASSESSMENT — PAIN - FUNCTIONAL ASSESSMENT: PAIN_FUNCTIONAL_ASSESSMENT: 0-10

## (undated) DEVICE — SUTURE STL SZ 0 L18IN NONABSORBABLE UD TIE PRE-CUT MFIL DS26

## (undated) DEVICE — SUTURE MCRYL SZ 3-0 L18IN ABSRB UD L19MM PS-2 3/8 CIR PRIM Y497G

## (undated) DEVICE — SVMMC HD AND NK PK

## (undated) DEVICE — 450 ML BOTTLE OF 0.05% CHLORHEXIDINE GLUCONATE IN 99.95% STERILE WATER FOR IRRIGATION, USP AND APPLICATOR.: Brand: IRRISEPT ANTIMICROBIAL WOUND LAVAGE

## (undated) DEVICE — GARMENT,MEDLINE,DVT,INT,CALF,MED, GEN2: Brand: MEDLINE

## (undated) DEVICE — 3M™ STERI-STRIP™ BLEND TONE SKIN CLOSURES, B1557, TAN, 1/2 IN X 4 IN (12MM X 100MM), 6 STRIPS/ENVELOPE: Brand: 3M™ STERI-STRIP™

## (undated) DEVICE — SUTURE MCRYL SZ 4-0 L18IN ABSRB UD P-3 L13MM 3/8 CIR PRIM Y494G

## (undated) DEVICE — INTENDED FOR TISSUE SEPARATION, AND OTHER PROCEDURES THAT REQUIRE A SHARP SURGICAL BLADE TO PUNCTURE OR CUT.: Brand: BARD-PARKER ® CARBON RIB-BACK BLADES

## (undated) DEVICE — SUTURE CHROMIC GUT SZ 5-0 L18IN ABSRB BRN L16MM PS-3 3/8 1636G

## (undated) DEVICE — Z DISCONTINUED BY MEDLINE USE 2711682 TRAY SKIN PREP DRY W/ PREM GLV

## (undated) DEVICE — SOLUTION SCRB 4OZ 10% POVIDONE IOD ANTIMIC BTL

## (undated) DEVICE — SPONGE LAP W18XL18IN WHT COT 4 PLY FLD STRUNG RADPQ DISP ST

## (undated) DEVICE — SOLUTION PREP POVIDONE IOD FOR SKIN MUCOUS MEM PRIOR TO

## (undated) DEVICE — GOWN,AURORA,NONRNF,XL,30/CS: Brand: MEDLINE

## (undated) DEVICE — 3M(TM) STERI-STRIP(TM) BLEND TONE SKIN CLOSURES (NON-REINFORCED) B1553: Brand: 3M™ STERI-STRIP™

## (undated) DEVICE — ADHESIVE SKIN CLSR 0.7ML TOP DERMBND ADV

## (undated) DEVICE — SYRINGE MED 10ML TRNSLUC BRL PLUNG BLK MRK POLYPR CTRL

## (undated) DEVICE — GLOVE ORANGE PI 7 1/2   MSG9075